# Patient Record
Sex: FEMALE | Race: WHITE | URBAN - NONMETROPOLITAN AREA
[De-identification: names, ages, dates, MRNs, and addresses within clinical notes are randomized per-mention and may not be internally consistent; named-entity substitution may affect disease eponyms.]

---

## 2017-02-13 RX ORDER — DULOXETIN HYDROCHLORIDE 60 MG/1
CAPSULE, DELAYED RELEASE ORAL
Qty: 30 CAPSULE | Refills: 0 | Status: SHIPPED | OUTPATIENT
Start: 2017-02-13 | End: 2017-05-18

## 2017-02-14 RX ORDER — DULOXETIN HYDROCHLORIDE 60 MG/1
CAPSULE, DELAYED RELEASE ORAL
Qty: 30 CAPSULE | Refills: 0 | OUTPATIENT
Start: 2017-02-14

## 2017-02-15 RX ORDER — DULOXETIN HYDROCHLORIDE 60 MG/1
CAPSULE, DELAYED RELEASE ORAL
Qty: 30 CAPSULE | Refills: 0 | OUTPATIENT
Start: 2017-02-15

## 2017-04-07 RX ORDER — QUETIAPINE 25 MG/1
TABLET, FILM COATED ORAL
Qty: 180 TABLET | Refills: 5 | Status: SHIPPED | OUTPATIENT
Start: 2017-04-07 | End: 2018-01-21

## 2017-05-01 RX ORDER — QUETIAPINE 25 MG/1
TABLET, FILM COATED ORAL
Qty: 60 TABLET | Refills: 0 | OUTPATIENT
Start: 2017-05-01

## 2017-05-18 ENCOUNTER — OFFICE VISIT (OUTPATIENT)
Dept: FAMILY MEDICINE CLINIC | Facility: CLINIC | Age: 42
End: 2017-05-18

## 2017-05-18 DIAGNOSIS — R53.83 FATIGUE DUE TO DEPRESSION: Primary | ICD-10-CM

## 2017-05-18 DIAGNOSIS — I10 ESSENTIAL HYPERTENSION: ICD-10-CM

## 2017-05-18 DIAGNOSIS — F32.A FATIGUE DUE TO DEPRESSION: Primary | ICD-10-CM

## 2017-05-18 DIAGNOSIS — F39 MOOD DISORDER (HCC): ICD-10-CM

## 2017-05-18 PROCEDURE — 85025 COMPLETE CBC W/AUTO DIFF WBC: CPT | Performed by: INTERNAL MEDICINE

## 2017-05-18 PROCEDURE — 80053 COMPREHEN METABOLIC PANEL: CPT | Performed by: INTERNAL MEDICINE

## 2017-05-18 PROCEDURE — 99214 OFFICE O/P EST MOD 30 MIN: CPT | Performed by: INTERNAL MEDICINE

## 2017-05-18 PROCEDURE — 80061 LIPID PANEL: CPT | Performed by: INTERNAL MEDICINE

## 2017-05-18 PROCEDURE — 84443 ASSAY THYROID STIM HORMONE: CPT | Performed by: INTERNAL MEDICINE

## 2017-05-18 RX ORDER — SUMATRIPTAN 100 MG/1
100 TABLET, FILM COATED ORAL EVERY 2 HOUR PRN
Qty: 9 TABLET | Refills: 1 | Status: SHIPPED | OUTPATIENT
Start: 2017-05-18

## 2017-05-18 RX ORDER — DULOXETIN HYDROCHLORIDE 60 MG/1
CAPSULE, DELAYED RELEASE ORAL
Qty: 90 CAPSULE | Refills: 3 | Status: SHIPPED | OUTPATIENT
Start: 2017-05-18 | End: 2018-01-26

## 2017-05-18 NOTE — PROGRESS NOTES
Valeria Mcmullen is a 46year old female. HPI:   Pt has extreme fatigue, no improvement after sleep, coffee causes diarrhea. Drinks wine sometimes, and vapes once and a while. Her son in 26 and active army. He moved out and lives with girlfriend.  She has nourished,in no apparent distress  SKIN: face is kathy  HEENT: atraumatic, normocephalic,ears and throat are clear  NECK: supple,no adenopathy,no bruits  LUNGS: clear to auscultation  CARDIO: RRR without murmur  GI: good BS's,no masses, HSM or tenderness

## 2017-05-26 VITALS
DIASTOLIC BLOOD PRESSURE: 88 MMHG | TEMPERATURE: 98 F | BODY MASS INDEX: 27 KG/M2 | SYSTOLIC BLOOD PRESSURE: 130 MMHG | WEIGHT: 152 LBS

## 2017-06-16 ENCOUNTER — OFFICE VISIT (OUTPATIENT)
Dept: FAMILY MEDICINE CLINIC | Facility: CLINIC | Age: 42
End: 2017-06-16

## 2017-06-16 ENCOUNTER — HOSPITAL ENCOUNTER (OUTPATIENT)
Dept: GENERAL RADIOLOGY | Age: 42
Discharge: HOME | End: 2017-06-16
Attending: FAMILY MEDICINE
Payer: COMMERCIAL

## 2017-06-16 VITALS
RESPIRATION RATE: 18 BRPM | BODY MASS INDEX: 27 KG/M2 | SYSTOLIC BLOOD PRESSURE: 138 MMHG | TEMPERATURE: 98 F | WEIGHT: 157.38 LBS | DIASTOLIC BLOOD PRESSURE: 88 MMHG | HEART RATE: 78 BPM

## 2017-06-16 DIAGNOSIS — S99.912A LEFT ANKLE INJURY, INITIAL ENCOUNTER: ICD-10-CM

## 2017-06-16 DIAGNOSIS — S99.912A LEFT ANKLE INJURY, INITIAL ENCOUNTER: Primary | ICD-10-CM

## 2017-06-16 PROCEDURE — 73610 X-RAY EXAM OF ANKLE: CPT | Performed by: FAMILY MEDICINE

## 2017-06-16 PROCEDURE — 99213 OFFICE O/P EST LOW 20 MIN: CPT | Performed by: FAMILY MEDICINE

## 2017-06-16 RX ORDER — IBUPROFEN 200 MG
200 TABLET ORAL EVERY 6 HOURS PRN
COMMUNITY

## 2017-06-16 NOTE — PROGRESS NOTES
Isabella Gaitan is a 46year old female. Patient presents with: Other: twisted her LT ankle yesterday while doing yard work, has done ice and elevation. . room 1       HPI:   No swelling or bruising. Pain laterally.      Current Outpatient Prescriptions on minutes 3 times daily. Try OTC  NSAIDS such as motrin, advil or aleve but use only as directed and not in combination. If pain, swelling, imobilty getting worse or unable to return to normal activities within 3-4 days call or RTC.     No orders of the defi

## 2017-06-17 ENCOUNTER — TELEPHONE (OUTPATIENT)
Dept: FAMILY MEDICINE CLINIC | Facility: CLINIC | Age: 42
End: 2017-06-17

## 2017-06-17 NOTE — TELEPHONE ENCOUNTER
Injured foot turned ankle Left side of foot and bottom of foot painful, a little swelling, states more painful today , she is taking ibuprofen,ice and elevating States cant bear weight , per DR Yu Prime continue with ice , elevate no weight bearing pt is to p

## 2017-08-15 ENCOUNTER — TELEPHONE (OUTPATIENT)
Dept: FAMILY MEDICINE CLINIC | Facility: CLINIC | Age: 42
End: 2017-08-15

## 2017-08-15 NOTE — TELEPHONE ENCOUNTER
Needs to follow up with pcp tomorrow from [facility] yesterday for back pain and abdominal pain   No openings for that type of visit   Please advise

## 2017-08-16 ENCOUNTER — OFFICE VISIT (OUTPATIENT)
Dept: FAMILY MEDICINE CLINIC | Facility: CLINIC | Age: 42
End: 2017-08-16

## 2017-08-16 VITALS
BODY MASS INDEX: 28.17 KG/M2 | TEMPERATURE: 99 F | DIASTOLIC BLOOD PRESSURE: 100 MMHG | SYSTOLIC BLOOD PRESSURE: 150 MMHG | WEIGHT: 159 LBS | RESPIRATION RATE: 16 BRPM | HEIGHT: 63 IN | HEART RATE: 90 BPM

## 2017-08-16 DIAGNOSIS — R10.31 RIGHT LOWER QUADRANT ABDOMINAL PAIN: Primary | ICD-10-CM

## 2017-08-16 DIAGNOSIS — I10 ESSENTIAL HYPERTENSION: ICD-10-CM

## 2017-08-16 DIAGNOSIS — Z12.39 SCREENING FOR BREAST CANCER: ICD-10-CM

## 2017-08-16 DIAGNOSIS — R73.9 HIGH BLOOD SUGAR: ICD-10-CM

## 2017-08-16 PROCEDURE — 83036 HEMOGLOBIN GLYCOSYLATED A1C: CPT | Performed by: INTERNAL MEDICINE

## 2017-08-16 PROCEDURE — 80053 COMPREHEN METABOLIC PANEL: CPT | Performed by: INTERNAL MEDICINE

## 2017-08-16 PROCEDURE — 99214 OFFICE O/P EST MOD 30 MIN: CPT | Performed by: INTERNAL MEDICINE

## 2017-08-16 PROCEDURE — 84443 ASSAY THYROID STIM HORMONE: CPT | Performed by: INTERNAL MEDICINE

## 2017-08-16 RX ORDER — QUETIAPINE 50 MG/1
50 TABLET, FILM COATED ORAL
COMMUNITY
End: 2018-01-26

## 2017-08-16 RX ORDER — DULOXETIN HYDROCHLORIDE 60 MG/1
60 CAPSULE, DELAYED RELEASE ORAL
Status: ON HOLD | COMMUNITY
End: 2018-01-21

## 2017-08-16 RX ORDER — GABAPENTIN 600 MG/1
600 TABLET ORAL
COMMUNITY
End: 2018-01-21

## 2017-08-16 NOTE — PROGRESS NOTES
Briana Peng is a 38year old female. HPI:   Pt has some increased blood sugar and liver enzymes are elevated. She was seen at the [facility] and had a workup for right lower abdominal pain.   She had a normal pelic, CT abdomen, Urinalysis and labs, with th large)   Pulse 90   Temp 98.7 °F (37.1 °C) (Temporal)   Resp 16   Ht 63\"   Wt 159 lb   BMI 28.17 kg/m²   GENERAL: well developed, well nourished,in no apparent distress  SKIN: no rashes,no suspicious lesions  HEENT: atraumatic, normocephalic,ears and thro

## 2017-08-17 LAB
ALBUMIN SERPL-MCNC: 4.3 G/DL (ref 3.5–4.8)
ALP LIVER SERPL-CCNC: 84 U/L (ref 37–98)
ALT SERPL-CCNC: 91 U/L (ref 14–54)
AST SERPL-CCNC: 82 U/L (ref 15–41)
BILIRUB SERPL-MCNC: 0.6 MG/DL (ref 0.1–2)
BUN BLD-MCNC: 7 MG/DL (ref 8–20)
CALCIUM BLD-MCNC: 9.6 MG/DL (ref 8.3–10.3)
CHLORIDE: 106 MMOL/L (ref 101–111)
CO2: 25 MMOL/L (ref 22–32)
CREAT BLD-MCNC: 0.87 MG/DL (ref 0.55–1.02)
EST. AVERAGE GLUCOSE BLD GHB EST-MCNC: 114 MG/DL (ref 68–126)
GLUCOSE BLD-MCNC: 109 MG/DL (ref 70–99)
HBA1C MFR BLD HPLC: 5.6 % (ref ?–5.7)
M PROTEIN MFR SERPL ELPH: 8.3 G/DL (ref 6.1–8.3)
POTASSIUM SERPL-SCNC: 3.8 MMOL/L (ref 3.6–5.1)
SODIUM SERPL-SCNC: 139 MMOL/L (ref 136–144)
TSI SER-ACNC: 0.86 MIU/ML (ref 0.35–5.5)

## 2017-08-23 RX ORDER — GABAPENTIN 600 MG/1
TABLET ORAL
Qty: 270 TABLET | Refills: 0 | Status: SHIPPED | OUTPATIENT
Start: 2017-08-23 | End: 2017-11-25

## 2017-08-29 ENCOUNTER — OFFICE VISIT (OUTPATIENT)
Dept: FAMILY MEDICINE CLINIC | Facility: CLINIC | Age: 42
End: 2017-08-29

## 2017-08-29 VITALS
RESPIRATION RATE: 16 BRPM | HEIGHT: 62 IN | DIASTOLIC BLOOD PRESSURE: 88 MMHG | SYSTOLIC BLOOD PRESSURE: 140 MMHG | HEART RATE: 80 BPM | TEMPERATURE: 98 F | BODY MASS INDEX: 29.72 KG/M2 | WEIGHT: 161.5 LBS

## 2017-08-29 DIAGNOSIS — R79.89 ELEVATED LFTS: Primary | ICD-10-CM

## 2017-08-29 PROCEDURE — 99213 OFFICE O/P EST LOW 20 MIN: CPT | Performed by: INTERNAL MEDICINE

## 2017-08-29 PROCEDURE — 80076 HEPATIC FUNCTION PANEL: CPT | Performed by: INTERNAL MEDICINE

## 2017-08-29 NOTE — PROGRESS NOTES
Elyce Councilman is a 38year old female. HPI:   Patient presents for recheck of her hypertension. Pt has been taking medications as instructed, no medication side effects, home BP monitoring in the range of 255-684'L systolic and 25-71'H diastolic. for Pain.  Disp:  Rfl:       Past Medical History:   Diagnosis Date   • ACE inhibitor-aggravated angioedema 8/28/2013   • Anxiety state, unspecified    • Cervicalgia 11/7/2012   • Depression    • Fibromyalgia 11/7/2012   • Strain of thoracic spine    • Tens

## 2017-08-30 DIAGNOSIS — R79.89 ELEVATED LFTS: Primary | ICD-10-CM

## 2017-08-30 LAB
ALBUMIN SERPL-MCNC: 4.3 G/DL (ref 3.5–4.8)
ALP LIVER SERPL-CCNC: 67 U/L (ref 37–98)
ALT SERPL-CCNC: 73 U/L (ref 14–54)
AST SERPL-CCNC: 42 U/L (ref 15–41)
BILIRUB DIRECT SERPL-MCNC: <0.1 MG/DL (ref 0.1–0.5)
BILIRUB SERPL-MCNC: 0.2 MG/DL (ref 0.1–2)
M PROTEIN MFR SERPL ELPH: 8.1 G/DL (ref 6.1–8.3)

## 2017-09-30 ENCOUNTER — PATIENT OUTREACH (OUTPATIENT)
Dept: FAMILY MEDICINE CLINIC | Facility: CLINIC | Age: 42
End: 2017-09-30

## 2017-11-25 RX ORDER — GABAPENTIN 600 MG/1
TABLET ORAL
Qty: 270 TABLET | Refills: 0 | Status: SHIPPED | OUTPATIENT
Start: 2017-11-25 | End: 2018-01-21

## 2018-01-13 ENCOUNTER — CHARTING TRANS (OUTPATIENT)
Dept: OTHER | Age: 43
End: 2018-01-13

## 2018-01-21 PROBLEM — F10.20 ALCOHOL DEPENDENCE (HCC): Status: ACTIVE | Noted: 2018-01-21

## 2018-01-22 PROBLEM — G43.909 MIGRAINE: Status: ACTIVE | Noted: 2018-01-22

## 2018-01-22 PROBLEM — K58.9 IBS (IRRITABLE BOWEL SYNDROME): Status: ACTIVE | Noted: 2018-01-22

## 2018-01-22 PROBLEM — L71.9 ROSACEA: Status: ACTIVE | Noted: 2018-01-22

## 2018-01-22 PROBLEM — R79.89 ABNORMAL LFTS: Status: ACTIVE | Noted: 2018-01-22

## 2018-01-22 PROBLEM — F10.20 ALCOHOL USE DISORDER, SEVERE, DEPENDENCE (HCC): Status: ACTIVE | Noted: 2018-01-21

## 2018-01-23 PROBLEM — F32.A DEPRESSION: Status: ACTIVE | Noted: 2018-01-23

## 2018-01-30 ENCOUNTER — TELEPHONE (OUTPATIENT)
Dept: FAMILY MEDICINE CLINIC | Facility: CLINIC | Age: 43
End: 2018-01-30

## 2018-03-10 RX ORDER — GABAPENTIN 600 MG/1
600 TABLET ORAL 3 TIMES DAILY
Qty: 90 TABLET | Refills: 0 | OUTPATIENT
Start: 2018-03-10

## 2018-03-12 RX ORDER — GABAPENTIN 600 MG/1
600 TABLET ORAL 3 TIMES DAILY
Qty: 90 TABLET | Refills: 0 | Status: SHIPPED | OUTPATIENT
Start: 2018-03-12 | End: 2018-04-06

## 2018-03-21 NOTE — PROGRESS NOTES
Bozena Valadez is a 38year old female. HPI:   Pt has depression and hx of substance abuse. She took a bottle of seroquel in January and tried to hang herself.   She was admitted for inpt stay at [facility], then to [facility] for 6 weeks, the PHP partial Smokeless tobacco: Current User                    Comment: DAILY VAPE  Alcohol use: Yes              Comment: red wine-average 4-6 glasses a night.  about              2 1/2 bottles        REVIEW OF SYSTEMS:   GENERAL HEALTH: feels well otherwise

## 2018-03-26 RX ORDER — HYDROCHLOROTHIAZIDE 25 MG/1
25 TABLET ORAL DAILY
Qty: 90 TABLET | Refills: 0 | Status: SHIPPED | OUTPATIENT
Start: 2018-03-26 | End: 2018-06-28

## 2018-03-26 RX ORDER — FERROUS SULFATE 325(65) MG
325 TABLET ORAL
Qty: 90 TABLET | Refills: 0 | Status: SHIPPED | OUTPATIENT
Start: 2018-03-26 | End: 2018-06-28

## 2018-04-06 RX ORDER — GABAPENTIN 600 MG/1
600 TABLET ORAL 3 TIMES DAILY
Qty: 90 TABLET | Refills: 0 | Status: SHIPPED | OUTPATIENT
Start: 2018-04-06 | End: 2018-05-09

## 2018-04-06 NOTE — TELEPHONE ENCOUNTER
Last office visit 3-21-18  Last refill 3-12-18 #90  [facility] patient- 90 day pended.

## 2018-04-14 DIAGNOSIS — F31.5 BIPOLAR I DISORDER, MOST RECENT EPISODE (OR CURRENT) DEPRESSED, SEVERE, SPECIFIED AS WITH PSYCHOTIC BEHAVIOR (HCC): ICD-10-CM

## 2018-04-14 DIAGNOSIS — F33.2 SEVERE EPISODE OF RECURRENT MAJOR DEPRESSIVE DISORDER, WITHOUT PSYCHOTIC FEATURES (HCC): ICD-10-CM

## 2018-04-14 RX ORDER — QUETIAPINE 200 MG/1
200 TABLET, FILM COATED ORAL NIGHTLY
Qty: 30 TABLET | Refills: 0 | Status: SHIPPED | OUTPATIENT
Start: 2018-04-14 | End: 2018-05-14

## 2018-04-14 RX ORDER — DULOXETIN HYDROCHLORIDE 60 MG/1
120 CAPSULE, DELAYED RELEASE ORAL DAILY
Qty: 60 CAPSULE | Refills: 0 | Status: SHIPPED | OUTPATIENT
Start: 2018-04-14 | End: 2018-05-14

## 2018-04-14 NOTE — TELEPHONE ENCOUNTER
DULoxetine HCl (CYMBALTA) 60 MG Oral Cap DR Particles   QUEtiapine Fumarate 200 MG Oral Tab   PLEASE SEND REFILL TO [facility]

## 2018-04-27 ENCOUNTER — TELEPHONE (OUTPATIENT)
Dept: FAMILY MEDICINE CLINIC | Facility: CLINIC | Age: 43
End: 2018-04-27

## 2018-04-27 RX ORDER — QUETIAPINE 25 MG/1
25 TABLET, FILM COATED ORAL 2 TIMES DAILY
Qty: 60 TABLET | Refills: 2 | Status: SHIPPED | OUTPATIENT
Start: 2018-04-27 | End: 2018-07-25

## 2018-04-27 NOTE — TELEPHONE ENCOUNTER
Pt. States she takes Quetiapine 25 mg bid ( in the morning and night) and was given 60 tablets. Pt. States she has 4 left. The Quetiapine 200mcg she takes daily. She is only looking to refill the 25mg script.

## 2018-04-27 NOTE — TELEPHONE ENCOUNTER
Spoke with Pt. The request is for Quetiapine 25 mg. Prescribed by a different Dr. Kavita Pittman office visit:  3/21/2018  Last refill for Quetiapine 25 mg: 3/19/2018  No future appointments.

## 2018-04-27 NOTE — TELEPHONE ENCOUNTER
She is not due till 5/12 for the 200 mcg and given the 25 mg by psych last in March how has she been taking these?

## 2018-05-09 RX ORDER — GABAPENTIN 600 MG/1
TABLET ORAL
Qty: 90 TABLET | Refills: 0 | Status: SHIPPED | OUTPATIENT
Start: 2018-05-09 | End: 2018-06-08

## 2018-05-09 NOTE — TELEPHONE ENCOUNTER
Last office visit: 3/21/2018    Last refill:  8/25/2016    Pending Prescriptions Disp Refills    GABAPENTIN 600 MG Oral Tab [Pharmacy Med Name: GABAPENTIN 600MG TABLETS] 90 tablet 0     Sig: TAKE 1 TABLET(600 MG) BY MOUTH THREE TIMES DAILY         Corie garcia

## 2018-05-14 DIAGNOSIS — F33.2 SEVERE EPISODE OF RECURRENT MAJOR DEPRESSIVE DISORDER, WITHOUT PSYCHOTIC FEATURES (HCC): ICD-10-CM

## 2018-05-14 DIAGNOSIS — F31.5 BIPOLAR I DISORDER, MOST RECENT EPISODE (OR CURRENT) DEPRESSED, SEVERE, SPECIFIED AS WITH PSYCHOTIC BEHAVIOR (HCC): ICD-10-CM

## 2018-05-14 RX ORDER — DULOXETIN HYDROCHLORIDE 60 MG/1
CAPSULE, DELAYED RELEASE ORAL
Qty: 60 CAPSULE | Refills: 0 | Status: SHIPPED | OUTPATIENT
Start: 2018-05-14 | End: 2018-06-14

## 2018-05-14 RX ORDER — QUETIAPINE 200 MG/1
TABLET, FILM COATED ORAL
Qty: 30 TABLET | Refills: 0 | Status: SHIPPED | OUTPATIENT
Start: 2018-05-14 | End: 2018-06-17

## 2018-05-14 NOTE — TELEPHONE ENCOUNTER
Last office visit: 3/21/2018    Last refill for Duloxetine HCL:  4/14/2018  Last Quetiapine Fumarate:  4/27/2018      Pending Prescriptions Disp Refills    DULOXETINE HCL 60 MG Oral Cap DR Particles [Pharmacy Med Name: DULOXETINE DR 60MG CAPSULES] 60 capsu

## 2018-06-08 RX ORDER — GABAPENTIN 600 MG/1
TABLET ORAL
Qty: 90 TABLET | Refills: 0 | Status: SHIPPED | OUTPATIENT
Start: 2018-06-08 | End: 2018-07-10

## 2018-06-14 DIAGNOSIS — F33.2 SEVERE EPISODE OF RECURRENT MAJOR DEPRESSIVE DISORDER, WITHOUT PSYCHOTIC FEATURES (HCC): ICD-10-CM

## 2018-06-14 RX ORDER — DULOXETIN HYDROCHLORIDE 60 MG/1
CAPSULE, DELAYED RELEASE ORAL
Qty: 60 CAPSULE | Refills: 0 | Status: SHIPPED | OUTPATIENT
Start: 2018-06-14 | End: 2018-07-13

## 2018-06-17 DIAGNOSIS — F31.5 BIPOLAR I DISORDER, MOST RECENT EPISODE (OR CURRENT) DEPRESSED, SEVERE, SPECIFIED AS WITH PSYCHOTIC BEHAVIOR (HCC): ICD-10-CM

## 2018-06-18 RX ORDER — QUETIAPINE 200 MG/1
TABLET, FILM COATED ORAL
Qty: 30 TABLET | Refills: 0 | Status: SHIPPED | OUTPATIENT
Start: 2018-06-18 | End: 2018-07-20

## 2018-06-28 RX ORDER — FERROUS SULFATE 325(65) MG
TABLET ORAL
Qty: 90 TABLET | Refills: 0 | Status: SHIPPED | OUTPATIENT
Start: 2018-06-28

## 2018-06-28 RX ORDER — HYDROCHLOROTHIAZIDE 25 MG/1
TABLET ORAL
Qty: 90 TABLET | Refills: 0 | Status: SHIPPED | OUTPATIENT
Start: 2018-06-28 | End: 2018-09-09

## 2018-07-11 RX ORDER — GABAPENTIN 600 MG/1
TABLET ORAL
Qty: 90 TABLET | Refills: 0 | Status: SHIPPED | OUTPATIENT
Start: 2018-07-11 | End: 2018-08-05

## 2018-07-13 DIAGNOSIS — F33.2 SEVERE EPISODE OF RECURRENT MAJOR DEPRESSIVE DISORDER, WITHOUT PSYCHOTIC FEATURES (HCC): ICD-10-CM

## 2018-07-14 NOTE — TELEPHONE ENCOUNTER
Last office visit: 3/21/2018    Last refill: 6/14/2018    Pending Prescriptions Disp Refills    DULOXETINE HCL 60 MG Oral Cap DR Particles [Pharmacy Med Name: DULOXETINE DR 60MG CAPSULES] 60 capsule 0     Sig: TAKE 2 CAPSULES(120 MG) BY MOUTH DAILY

## 2018-07-16 RX ORDER — DULOXETIN HYDROCHLORIDE 60 MG/1
CAPSULE, DELAYED RELEASE ORAL
Qty: 60 CAPSULE | Refills: 0 | Status: SHIPPED | OUTPATIENT
Start: 2018-07-16 | End: 2018-08-09

## 2018-07-20 DIAGNOSIS — F31.5 BIPOLAR I DISORDER, MOST RECENT EPISODE (OR CURRENT) DEPRESSED, SEVERE, SPECIFIED AS WITH PSYCHOTIC BEHAVIOR (HCC): ICD-10-CM

## 2018-07-21 RX ORDER — QUETIAPINE 200 MG/1
TABLET, FILM COATED ORAL
Qty: 30 TABLET | Refills: 0 | Status: SHIPPED | OUTPATIENT
Start: 2018-07-21 | End: 2018-08-19

## 2018-07-21 RX ORDER — QUETIAPINE 200 MG/1
TABLET, FILM COATED ORAL
Qty: 30 TABLET | Refills: 0 | Status: SHIPPED | OUTPATIENT
Start: 2018-07-21 | End: 2018-09-10

## 2018-07-25 RX ORDER — QUETIAPINE 25 MG/1
TABLET, FILM COATED ORAL
Qty: 60 TABLET | Refills: 0 | Status: SHIPPED | OUTPATIENT
Start: 2018-07-25 | End: 2018-08-19

## 2018-07-25 NOTE — TELEPHONE ENCOUNTER
Patient advised, she said that she has not had insurance and applied for it she will schedule a visit as soon as she gets the card.

## 2018-08-06 RX ORDER — GABAPENTIN 600 MG/1
TABLET ORAL
Qty: 90 TABLET | Refills: 0 | Status: SHIPPED | OUTPATIENT
Start: 2018-08-06 | End: 2018-09-01

## 2018-08-09 ENCOUNTER — HOSPITAL ENCOUNTER (OUTPATIENT)
Dept: GENERAL RADIOLOGY | Age: 43
Discharge: HOME | End: 2018-08-09
Attending: INTERNAL MEDICINE
Payer: MEDICAID

## 2018-08-09 ENCOUNTER — OFFICE VISIT (OUTPATIENT)
Dept: FAMILY MEDICINE CLINIC | Facility: CLINIC | Age: 43
End: 2018-08-09
Payer: MEDICAID

## 2018-08-09 ENCOUNTER — LAB ENCOUNTER (OUTPATIENT)
Dept: LAB | Age: 43
End: 2018-08-09
Attending: INTERNAL MEDICINE
Payer: MEDICAID

## 2018-08-09 VITALS
TEMPERATURE: 99 F | DIASTOLIC BLOOD PRESSURE: 86 MMHG | OXYGEN SATURATION: 98 % | WEIGHT: 184.13 LBS | BODY MASS INDEX: 33 KG/M2 | HEART RATE: 92 BPM | SYSTOLIC BLOOD PRESSURE: 128 MMHG

## 2018-08-09 DIAGNOSIS — R79.89 ELEVATED LFTS: ICD-10-CM

## 2018-08-09 DIAGNOSIS — M79.605 LEFT LEG PAIN: ICD-10-CM

## 2018-08-09 DIAGNOSIS — M79.605 LEFT LEG PAIN: Primary | ICD-10-CM

## 2018-08-09 DIAGNOSIS — R14.0 ABDOMINAL BLOATING: ICD-10-CM

## 2018-08-09 DIAGNOSIS — R44.9 SENSORY DEFICIT, LEFT: ICD-10-CM

## 2018-08-09 DIAGNOSIS — Z12.39 SCREENING FOR BREAST CANCER: ICD-10-CM

## 2018-08-09 DIAGNOSIS — R73.9 HYPERGLYCEMIA: ICD-10-CM

## 2018-08-09 DIAGNOSIS — F33.2 SEVERE EPISODE OF RECURRENT MAJOR DEPRESSIVE DISORDER, WITHOUT PSYCHOTIC FEATURES (HCC): ICD-10-CM

## 2018-08-09 LAB
ALBUMIN SERPL-MCNC: 4 G/DL (ref 3.5–4.8)
ALBUMIN/GLOB SERPL: 0.9 {RATIO} (ref 1–2)
ALP LIVER SERPL-CCNC: 67 U/L (ref 37–98)
ALT SERPL-CCNC: 111 U/L (ref 14–54)
ANION GAP SERPL CALC-SCNC: 10 MMOL/L (ref 0–18)
AST SERPL-CCNC: 91 U/L (ref 15–41)
BASOPHILS # BLD AUTO: 0.08 X10(3) UL (ref 0–0.1)
BASOPHILS NFR BLD AUTO: 0.8 %
BILIRUB DIRECT SERPL-MCNC: 0.1 MG/DL (ref 0.1–0.5)
BILIRUB SERPL-MCNC: 0.4 MG/DL (ref 0.1–2)
BUN BLD-MCNC: 9 MG/DL (ref 8–20)
BUN/CREAT SERPL: 9.2 (ref 10–20)
CALCIUM BLD-MCNC: 9.5 MG/DL (ref 8.3–10.3)
CHLORIDE SERPL-SCNC: 99 MMOL/L (ref 101–111)
CO2 SERPL-SCNC: 29 MMOL/L (ref 22–32)
CREAT BLD-MCNC: 0.98 MG/DL (ref 0.55–1.02)
EOSINOPHIL # BLD AUTO: 0.28 X10(3) UL (ref 0–0.3)
EOSINOPHIL NFR BLD AUTO: 2.6 %
ERYTHROCYTE [DISTWIDTH] IN BLOOD BY AUTOMATED COUNT: 12.8 % (ref 11.5–16)
GLOBULIN PLAS-MCNC: 4.3 G/DL (ref 2.5–3.7)
GLUCOSE BLD-MCNC: 133 MG/DL (ref 70–99)
HCT VFR BLD AUTO: 43.6 % (ref 34–50)
HGB BLD-MCNC: 14.5 G/DL (ref 12–16)
IMMATURE GRANULOCYTE COUNT: 0.03 X10(3) UL (ref 0–1)
IMMATURE GRANULOCYTE RATIO %: 0.3 %
LIPASE: 192 U/L (ref 73–393)
LYMPHOCYTES # BLD AUTO: 2.55 X10(3) UL (ref 0.9–4)
LYMPHOCYTES NFR BLD AUTO: 24.1 %
M PROTEIN MFR SERPL ELPH: 8.3 G/DL (ref 6.1–8.3)
MCH RBC QN AUTO: 30.1 PG (ref 27–33.2)
MCHC RBC AUTO-ENTMCNC: 33.3 G/DL (ref 31–37)
MCV RBC AUTO: 90.6 FL (ref 81–100)
MONOCYTES # BLD AUTO: 0.69 X10(3) UL (ref 0.1–1)
MONOCYTES NFR BLD AUTO: 6.5 %
NEUTROPHIL ABS PRELIM: 6.95 X10 (3) UL (ref 1.3–6.7)
NEUTROPHILS # BLD AUTO: 6.95 X10(3) UL (ref 1.3–6.7)
NEUTROPHILS NFR BLD AUTO: 65.7 %
OSMOLALITY SERPL CALC.SUM OF ELEC: 287 MOSM/KG (ref 275–295)
PLATELET # BLD AUTO: 355 10(3)UL (ref 150–450)
POTASSIUM SERPL-SCNC: 3.1 MMOL/L (ref 3.6–5.1)
RBC # BLD AUTO: 4.81 X10(6)UL (ref 3.8–5.1)
RED CELL DISTRIBUTION WIDTH-SD: 42.1 FL (ref 35.1–46.3)
SODIUM SERPL-SCNC: 138 MMOL/L (ref 136–144)
WBC # BLD AUTO: 10.6 X10(3) UL (ref 4–13)

## 2018-08-09 PROCEDURE — 80053 COMPREHEN METABOLIC PANEL: CPT

## 2018-08-09 PROCEDURE — 36415 COLL VENOUS BLD VENIPUNCTURE: CPT

## 2018-08-09 PROCEDURE — 83690 ASSAY OF LIPASE: CPT

## 2018-08-09 PROCEDURE — 82248 BILIRUBIN DIRECT: CPT

## 2018-08-09 PROCEDURE — 85025 COMPLETE CBC W/AUTO DIFF WBC: CPT

## 2018-08-09 PROCEDURE — 83036 HEMOGLOBIN GLYCOSYLATED A1C: CPT

## 2018-08-09 PROCEDURE — 73590 X-RAY EXAM OF LOWER LEG: CPT | Performed by: INTERNAL MEDICINE

## 2018-08-09 PROCEDURE — 99214 OFFICE O/P EST MOD 30 MIN: CPT | Performed by: INTERNAL MEDICINE

## 2018-08-09 RX ORDER — DULOXETIN HYDROCHLORIDE 60 MG/1
CAPSULE, DELAYED RELEASE ORAL
Qty: 180 CAPSULE | Refills: 0 | Status: SHIPPED | OUTPATIENT
Start: 2018-08-09 | End: 2018-11-08

## 2018-08-09 NOTE — PROGRESS NOTES
Bozena Valadez is a 38year old female. HPI:   Pt has been clean and sober. She has little stretches of rambo, where she can get a lot done followed by mild depression which sometimes results in unfinished tasks.  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  Disp: 9 tablet Rfl: 1      Past Medical History:   Diagnosis Date   • ACE inhibitor-aggravated angioedema 8/28/2013 circumference. MRI to look for local compression. Depression treated and at risk for alcohol relapse as a result   mammo ordered  Bloating is likely small intestine overgrowth confirmation is by breat testing, sees Dr Guicho Munoz, but has not follow up.   May tr

## 2018-08-10 LAB
EST. AVERAGE GLUCOSE BLD GHB EST-MCNC: 128 MG/DL (ref 68–126)
HBA1C MFR BLD HPLC: 6.1 % (ref ?–5.7)

## 2018-08-19 DIAGNOSIS — F31.5 BIPOLAR I DISORDER, MOST RECENT EPISODE (OR CURRENT) DEPRESSED, SEVERE, SPECIFIED AS WITH PSYCHOTIC BEHAVIOR (HCC): ICD-10-CM

## 2018-08-20 RX ORDER — QUETIAPINE FUMARATE 25 MG/1
TABLET, FILM COATED ORAL
Qty: 60 TABLET | Refills: 5 | Status: SHIPPED | OUTPATIENT
Start: 2018-08-20

## 2018-08-20 RX ORDER — QUETIAPINE FUMARATE 200 MG/1
TABLET, FILM COATED ORAL
Qty: 30 TABLET | Refills: 5 | Status: SHIPPED | OUTPATIENT
Start: 2018-08-20

## 2018-09-04 RX ORDER — GABAPENTIN 600 MG/1
TABLET ORAL
Qty: 90 TABLET | Refills: 5 | Status: SHIPPED | OUTPATIENT
Start: 2018-09-04

## 2018-09-10 ENCOUNTER — OFFICE VISIT (OUTPATIENT)
Dept: FAMILY MEDICINE CLINIC | Facility: CLINIC | Age: 43
End: 2018-09-10
Payer: MEDICAID

## 2018-09-10 ENCOUNTER — TELEPHONE (OUTPATIENT)
Dept: FAMILY MEDICINE CLINIC | Facility: CLINIC | Age: 43
End: 2018-09-10

## 2018-09-10 VITALS
SYSTOLIC BLOOD PRESSURE: 130 MMHG | HEART RATE: 82 BPM | TEMPERATURE: 98 F | WEIGHT: 188 LBS | OXYGEN SATURATION: 99 % | BODY MASS INDEX: 33 KG/M2 | DIASTOLIC BLOOD PRESSURE: 96 MMHG

## 2018-09-10 DIAGNOSIS — Z12.31 VISIT FOR SCREENING MAMMOGRAM: ICD-10-CM

## 2018-09-10 DIAGNOSIS — F17.200 SMOKER: ICD-10-CM

## 2018-09-10 DIAGNOSIS — J20.9 BRONCHITIS WITH BRONCHOSPASM: Primary | ICD-10-CM

## 2018-09-10 PROCEDURE — 99214 OFFICE O/P EST MOD 30 MIN: CPT | Performed by: INTERNAL MEDICINE

## 2018-09-10 RX ORDER — PREDNISONE 20 MG/1
TABLET ORAL
Qty: 10 TABLET | Refills: 0 | Status: SHIPPED | OUTPATIENT
Start: 2018-09-10 | End: 2018-09-20 | Stop reason: ALTCHOICE

## 2018-09-10 RX ORDER — HYDROCHLOROTHIAZIDE 25 MG/1
TABLET ORAL
Qty: 90 TABLET | Refills: 0 | Status: SHIPPED | OUTPATIENT
Start: 2018-09-10 | End: 2018-12-03

## 2018-09-10 NOTE — PROGRESS NOTES
HPI:   Sho Redding is a 41year old female who presents for upper respiratory symptoms for  2  weeks. Patient reports sore throat, congestion, dry cough. She works as a nanny now. She complains of fatigue and weight gain.   She continues to be clean a hypertension    • Fibromyalgia 2012   • IBS (irritable bowel syndrome)    • Strain of thoracic spine    • Tension headache, chronic       Past Surgical History:  No date: BREAST PROSTHESIS, SILICONE  No date:       Comment:  2 csxn  No date: auscultation  CARDIO: RRR without murmur  GI: good BS's,no masses, HSM or tenderness    ASSESSMENT AND PLAN:   Joseph Amin is a 41year old female who presents with Bronchitis with Bronchospasm.  PLAN: OTC decongestants, throat lozenges and tylenol, Z-p

## 2018-09-11 ENCOUNTER — TELEPHONE (OUTPATIENT)
Dept: FAMILY MEDICINE CLINIC | Facility: CLINIC | Age: 43
End: 2018-09-11

## 2018-09-11 RX ORDER — CIPROFLOXACIN 500 MG/1
500 TABLET, FILM COATED ORAL 2 TIMES DAILY
Qty: 20 TABLET | Refills: 0 | Status: SHIPPED | OUTPATIENT
Start: 2018-09-11 | End: 2018-09-20 | Stop reason: ALTCHOICE

## 2018-09-11 NOTE — TELEPHONE ENCOUNTER
SHE PICKED UP PREDNISONE, BUT THOUGHT WAS ALSO GOING TO BE AN ANTIBIOTIC?    USES [facility] [place]   PLEASE CALL PATIENT

## 2018-09-17 ENCOUNTER — TELEPHONE (OUTPATIENT)
Dept: FAMILY MEDICINE CLINIC | Facility: CLINIC | Age: 43
End: 2018-09-17

## 2018-09-17 RX ORDER — AMOXICILLIN AND CLAVULANATE POTASSIUM 875; 125 MG/1; MG/1
1 TABLET, FILM COATED ORAL 2 TIMES DAILY
Qty: 20 TABLET | Refills: 0 | Status: SHIPPED | OUTPATIENT
Start: 2018-09-17 | End: 2018-09-27

## 2018-09-17 NOTE — TELEPHONE ENCOUNTER
Pt. Called back, I scheduled her Thursday at 10 am for appt. But she is asking about getting different meds.

## 2018-09-17 NOTE — TELEPHONE ENCOUNTER
Patient said that she is not feeling much better, she said that she is still coughing and her chest is burning, She said she is still taking Cipro and the Prednisone is almost gone. She said she has the chills and has been \"sweating through her clothes\".

## 2018-09-17 NOTE — TELEPHONE ENCOUNTER
Patient advised, she will call us back and let us know if she can come in during one of the same day sick visit.

## 2018-09-20 ENCOUNTER — OFFICE VISIT (OUTPATIENT)
Dept: FAMILY MEDICINE CLINIC | Facility: CLINIC | Age: 43
End: 2018-09-20
Payer: MEDICAID

## 2018-09-20 ENCOUNTER — HOSPITAL ENCOUNTER (OUTPATIENT)
Dept: GENERAL RADIOLOGY | Age: 43
Discharge: HOME | End: 2018-09-20
Attending: INTERNAL MEDICINE
Payer: MEDICAID

## 2018-09-20 VITALS
TEMPERATURE: 98 F | OXYGEN SATURATION: 98 % | BODY MASS INDEX: 33 KG/M2 | SYSTOLIC BLOOD PRESSURE: 138 MMHG | DIASTOLIC BLOOD PRESSURE: 88 MMHG | HEART RATE: 104 BPM | WEIGHT: 184 LBS

## 2018-09-20 DIAGNOSIS — R05.9 COUGH: ICD-10-CM

## 2018-09-20 DIAGNOSIS — R05.9 COUGH: Primary | ICD-10-CM

## 2018-09-20 PROCEDURE — 71046 X-RAY EXAM CHEST 2 VIEWS: CPT | Performed by: INTERNAL MEDICINE

## 2018-09-20 PROCEDURE — 99214 OFFICE O/P EST MOD 30 MIN: CPT | Performed by: INTERNAL MEDICINE

## 2018-09-20 RX ORDER — BUDESONIDE AND FORMOTEROL FUMARATE DIHYDRATE 160; 4.5 UG/1; UG/1
1 AEROSOL RESPIRATORY (INHALATION) 2 TIMES DAILY
Qty: 1 INHALER | Refills: 0 | COMMUNITY
Start: 2018-09-20

## 2018-09-20 NOTE — PROGRESS NOTES
Brian Cash is a 41year old female. HPI:   She has been coughing for a month. 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  She had more frequent stools with Augmentin, but did not respond to CI use: Yes      Comment: red wine-average 4-6 glasses a night.  about 2 1/2 bottles     Drug use: Yes      Frequency: 7.0 times per week      Types: Cannabis      Comment: Alonso       REVIEW OF SYSTEMS:   GENERAL HEALTH: feels well otherwise  SKIN: rosa m

## 2018-10-02 RX ORDER — LIDOCAINE HYDROCHLORIDE 20 MG/ML
SOLUTION ORAL; TOPICAL
Qty: 90 TABLET | Refills: 0 | OUTPATIENT
Start: 2018-10-02

## 2018-11-08 DIAGNOSIS — F33.2 SEVERE EPISODE OF RECURRENT MAJOR DEPRESSIVE DISORDER, WITHOUT PSYCHOTIC FEATURES (HCC): ICD-10-CM

## 2018-11-08 RX ORDER — DULOXETIN HYDROCHLORIDE 60 MG/1
CAPSULE, DELAYED RELEASE ORAL
Qty: 180 CAPSULE | Refills: 0 | Status: SHIPPED | OUTPATIENT
Start: 2018-11-08 | End: 2019-01-12

## 2018-12-03 ENCOUNTER — TELEPHONE (OUTPATIENT)
Dept: FAMILY MEDICINE CLINIC | Facility: CLINIC | Age: 43
End: 2018-12-03

## 2018-12-03 RX ORDER — HYDROCHLOROTHIAZIDE 25 MG/1
TABLET ORAL
Qty: 90 TABLET | Refills: 3 | Status: SHIPPED | OUTPATIENT
Start: 2018-12-03

## 2018-12-03 NOTE — TELEPHONE ENCOUNTER
JAZMYNE FOR PT. TO CALL BACK RE: HER INS. PT. HAS [employer] AND WILL NEED TO SWITCH TO [employer] IF SHE WOULD LIKE TO REMAIN A PT. HERE.

## 2018-12-03 NOTE — TELEPHONE ENCOUNTER
Last office visit 9-20-18 138/88  Last refill HCTZ 9-10-18 #90  Has [employer] insurance. Cannot be seen here. [facility] desk has call out to patient to advise her of insurance situation. Requested quantity of 90 reduced to 30.

## 2018-12-03 NOTE — TELEPHONE ENCOUNTER
[facility] desk has call out to patient to advise her of insurance situation. Requested quantity of 180 changed to 60.

## 2018-12-03 NOTE — TELEPHONE ENCOUNTER
Last office visit 9-20-18 138/88  Last refill Metoprolol 10-2-18  Has [employer] insurance. Cannot be seen here.

## 2018-12-11 ENCOUNTER — TELEPHONE (OUTPATIENT)
Dept: FAMILY MEDICINE CLINIC | Facility: CLINIC | Age: 43
End: 2018-12-11

## 2018-12-11 NOTE — TELEPHONE ENCOUNTER
Nury Lanier was calling back re: insurance and she also wanted to get in to see Dr. Collins Rainey today. Pt. Has [employer]. I explained Dr. Collins Rainey does not accept [place] and she should go see a DrNitesh In the [employer] network or Urgent Care/ER.  I explained she would need t

## 2019-01-12 DIAGNOSIS — F33.2 SEVERE EPISODE OF RECURRENT MAJOR DEPRESSIVE DISORDER, WITHOUT PSYCHOTIC FEATURES (HCC): ICD-10-CM

## 2019-01-14 RX ORDER — DULOXETIN HYDROCHLORIDE 60 MG/1
CAPSULE, DELAYED RELEASE ORAL
Qty: 180 CAPSULE | Refills: 0 | Status: SHIPPED | OUTPATIENT
Start: 2019-01-14 | End: 2019-04-15

## 2019-03-15 RX ORDER — GABAPENTIN 600 MG/1
TABLET ORAL
Qty: 90 TABLET | Refills: 0 | OUTPATIENT
Start: 2019-03-15

## 2019-04-15 DIAGNOSIS — F33.2 SEVERE EPISODE OF RECURRENT MAJOR DEPRESSIVE DISORDER, WITHOUT PSYCHOTIC FEATURES (HCC): ICD-10-CM

## 2019-04-15 RX ORDER — DULOXETIN HYDROCHLORIDE 60 MG/1
CAPSULE, DELAYED RELEASE ORAL
Qty: 180 CAPSULE | Refills: 0 | Status: SHIPPED | OUTPATIENT
Start: 2019-04-15 | End: 2019-09-10

## 2019-04-15 NOTE — TELEPHONE ENCOUNTER
Last office visit: 9/20/2018  Last refill: 1/14/2019  Requested Prescriptions     Pending Prescriptions Disp Refills   • DULOXETINE HCL 60 MG Oral Cap DR Particles [Pharmacy Med Name: DULOXETINE DR 60MG CAPSULES] 180 capsule 0     Sig: TAKE 2 CAPSULES BY M

## 2019-06-04 ENCOUNTER — DIAGNOSTIC TRANS (OUTPATIENT)
Dept: OTHER | Age: 44
End: 2019-06-04

## 2019-06-04 ENCOUNTER — HOSPITAL (OUTPATIENT)
Dept: OTHER | Age: 44
End: 2019-06-04

## 2019-06-04 LAB
AMORPH SED URNS QL MICRO: NORMAL
AMPHETAMINES UR QL SCN>500 NG/ML: NEGATIVE
AMPHETAMINES UR QL SCN>500 NG/ML: NORMAL
ANALYZER ANC (IANC): ABNORMAL
ANION GAP SERPL CALC-SCNC: 17 MMOL/L (ref 10–20)
APAP SERPL-MCNC: <2 MCG/ML (ref 10–30)
APPEARANCE UR: CLEAR
BARBITURATES UR QL SCN>200 NG/ML: NEGATIVE
BARBITURATES UR QL SCN>200 NG/ML: NORMAL
BASOPHILS # BLD: 0.1 K/MCL (ref 0–0.3)
BASOPHILS NFR BLD: 0 %
BENZODIAZ UR QL SCN>200 NG/ML: NEGATIVE
BENZODIAZ UR QL SCN>200 NG/ML: NORMAL
BILIRUB UR QL: NEGATIVE
BUN SERPL-MCNC: 9 MG/DL (ref 6–20)
BUN/CREAT SERPL: 12 (ref 7–25)
BZE UR QL SCN>150 NG/ML: NEGATIVE
BZE UR QL SCN>150 NG/ML: NORMAL
CALCIUM SERPL-MCNC: 9 MG/DL (ref 8.4–10.2)
CANNABINOIDS UR QL SCN>50 NG/ML: NEGATIVE
CANNABINOIDS UR QL SCN>50 NG/ML: NORMAL
CAOX CRY URNS QL MICRO: NORMAL
CHLORIDE SERPL-SCNC: 108 MMOL/L (ref 98–107)
CO2 SERPL-SCNC: 21 MMOL/L (ref 21–32)
COLOR UR: YELLOW
CREAT SERPL-MCNC: 0.73 MG/DL (ref 0.51–0.95)
D DIMER PPP FEU-MCNC: <0.19 MG/L (FEU)
D DIMER PPP FEU-MCNC: NORMAL
DIFFERENTIAL METHOD BLD: ABNORMAL
EOSINOPHIL # BLD: 0 K/MCL (ref 0.1–0.5)
EOSINOPHIL NFR BLD: 0 %
EPITH CASTS #/AREA URNS LPF: NORMAL /[LPF]
ERYTHROCYTE [DISTWIDTH] IN BLOOD: 12.4 % (ref 11–15)
FATTY CASTS #/AREA URNS LPF: NORMAL /[LPF]
GLUCOSE SERPL-MCNC: 92 MG/DL (ref 65–99)
GLUCOSE UR-MCNC: NEGATIVE MG/DL
GRAN CASTS #/AREA URNS LPF: NORMAL /[LPF]
HCG POINT OF CARE (5HGRST): NEGATIVE
HCT VFR BLD CALC: 36.9 % (ref 36–46.5)
HGB BLD-MCNC: 12.7 G/DL (ref 12–15.5)
HGB UR QL: NEGATIVE
HYALINE CASTS #/AREA URNS LPF: NORMAL /[LPF]
IMM GRANULOCYTES # BLD AUTO: 0.1 K/MCL (ref 0–0.2)
IMM GRANULOCYTES NFR BLD: 0 %
KETONES UR-MCNC: NEGATIVE MG/DL
LEUKOCYTE ESTERASE UR QL STRIP: NEGATIVE
LYMPHOCYTES # BLD: 2.3 K/MCL (ref 1–4.8)
LYMPHOCYTES NFR BLD: 17 %
MAGNESIUM SERPL-MCNC: 2.3 MG/DL (ref 1.7–2.4)
MCH RBC QN AUTO: 29.8 PG (ref 26–34)
MCHC RBC AUTO-ENTMCNC: 34.4 G/DL (ref 32–36.5)
MCV RBC AUTO: 86.6 FL (ref 78–100)
METHADONE UR QL SCN>300 NG/ML: NEGATIVE NG/ML
METHADONE UR QL SCN>300 NG/ML: NORMAL
MICROSCOPIC (MT): NORMAL
MIXED CELL CASTS #/AREA URNS LPF: NORMAL /[LPF]
MONOCYTES # BLD: 0.5 K/MCL (ref 0.3–0.9)
MONOCYTES NFR BLD: 4 %
MUCOUS THREADS URNS QL MICRO: NORMAL
NEUTROPHILS # BLD: 10.9 K/MCL (ref 1.8–7.7)
NEUTROPHILS NFR BLD: 79 %
NEUTS SEG NFR BLD: ABNORMAL %
NITRITE UR QL: NEGATIVE
NRBC (NRBCRE): 0 /100 WBC
OPIATES UR QL SCN>300 NG/ML: NEGATIVE
OPIATES UR QL SCN>300 NG/ML: NORMAL
PCP UR QL SCN>25 NG/ML: NEGATIVE
PCP UR QL SCN>25 NG/ML: NORMAL
PH UR: 6 UNITS (ref 5–7)
PLATELET # BLD: 441 K/MCL (ref 140–450)
POTASSIUM SERPL-SCNC: 3.8 MMOL/L (ref 3.4–5.1)
PROT UR QL: NEGATIVE MG/DL
RBC # BLD: 4.26 MIL/MCL (ref 4–5.2)
RBC CASTS #/AREA URNS LPF: NORMAL /[LPF]
RENAL EPI CELLS #/AREA URNS HPF: NORMAL /[HPF]
SALICYLATES SERPL-MCNC: <2.8 MG/DL
SODIUM SERPL-SCNC: 142 MMOL/L (ref 135–145)
SP GR UR: 1 (ref 1–1.03)
SPECIMEN SOURCE: NORMAL
SPERM URNS QL MICRO: NORMAL
T VAGINALIS URNS QL MICRO: NORMAL
TRI-PHOS CRY URNS QL MICRO: NORMAL
TROPONIN I SERPL HS-MCNC: <0.02 NG/ML
TSH SERPL-ACNC: 2.29 MCUNITS/ML (ref 0.35–5)
TSH SERPL-ACNC: NORMAL M[IU]/L
URATE CRY URNS QL MICRO: NORMAL
URNS CMNT MICRO: NORMAL
UROBILINOGEN UR QL: 0.2 MG/DL (ref 0–1)
WAXY CASTS #/AREA URNS LPF: NORMAL /[LPF]
WBC # BLD: 13.8 K/MCL (ref 4.2–11)
WBC CASTS #/AREA URNS LPF: NORMAL /[LPF]
YEAST HYPHAE URNS QL MICRO: NORMAL
YEAST URNS QL MICRO: NORMAL

## 2019-06-04 PROCEDURE — 99285 EMERGENCY DEPT VISIT HI MDM: CPT | Performed by: EMERGENCY MEDICINE

## 2019-09-10 DIAGNOSIS — F33.2 SEVERE EPISODE OF RECURRENT MAJOR DEPRESSIVE DISORDER, WITHOUT PSYCHOTIC FEATURES (HCC): ICD-10-CM

## 2019-09-10 RX ORDER — DULOXETIN HYDROCHLORIDE 60 MG/1
CAPSULE, DELAYED RELEASE ORAL
Qty: 180 CAPSULE | Refills: 0 | Status: SHIPPED | OUTPATIENT
Start: 2019-09-10

## 2020-04-20 ENCOUNTER — TELEPHONE (OUTPATIENT)
Dept: FAMILY MEDICINE CLINIC | Facility: CLINIC | Age: 45
End: 2020-04-20

## 2020-04-20 NOTE — TELEPHONE ENCOUNTER
SENT THE REQUEST TO SCAN STAT PER THE [place] [facility], [facility]

## 2021-08-04 ENCOUNTER — TELEPHONE (OUTPATIENT)
Dept: FAMILY MEDICINE CLINIC | Facility: CLINIC | Age: 46
End: 2021-08-04

## 2021-08-11 ENCOUNTER — TELEPHONE (OUTPATIENT)
Dept: FAMILY MEDICINE CLINIC | Facility: CLINIC | Age: 46
End: 2021-08-11

## 2021-10-05 NOTE — TELEPHONE ENCOUNTER
----- Message from Bob Garcia MD sent at 10/5/2021  9:58 AM CDT -----  Labs were reviewed with no new concerns.  COVID-19 antibodies are not present.  STD labs are negative but gonorrhea, chlamydia and herpes are still pending.   Last office visit: 9/20/2018    Last CMP: 8/09/2018  Last B/P taken 9/20/2018    Last refill for Metoprolol Tartrate: 6/28/2018    Last refill for Ferosul: 6/28/2018   SHOULD PT CONTINUE????    Requested Prescriptions     Pending Prescriptions Disp Refills

## (undated) NOTE — LETTER
Date: 8/16/2017    Patient Name: Jaziel Gallardo          To Whom it may concern: The above patient was seen at the [facility] for treatment of a medical condition. This patient should be excused from work 8/14-8/15/2017.     The patient

## (undated) NOTE — Clinical Note
5/1/2017  [address] [place] [address]    We take each of our patient's health very seriously and the key to maintaining your health is an annual wellness physical.  Review of your medical records shows that it is time for your annual wel

## (undated) NOTE — Clinical Note
06/16/2017          To Whom It May Concern,    Due to medical illness, please excuse Markell Mosqueda from work today and 6/18/17  .     Sincerely,    Samantha Leal D.O., FAAFP

## (undated) NOTE — MR AVS SNAPSHOT
Pavel Ferraro  [address] [facility] [address]  [phone]               Thank you for choosing us for your health care visit with Woo Iglesias DO.   We are glad to serve you and happy to provide you with this summ TAKE 2 TABLETS(50 MG) BY MOUTH EVERY NIGHT   Commonly known as:  SEROQUEL           SUMAtriptan Succinate 100 MG Tabs   Take 1 tablet (100 mg total) by mouth every 2 (two) hours as needed for Migraine.    Commonly known as:  Rika Dash Get your heart pumping – brisk walking, biking, swimming Even 10 minute increments are effective and add up over the week   2 ½ hours per week – spread out over time Use a carolyn to keep you motivated   Don’t forget strength training with weights and resist